# Patient Record
Sex: MALE | Race: NATIVE HAWAIIAN OR OTHER PACIFIC ISLANDER
[De-identification: names, ages, dates, MRNs, and addresses within clinical notes are randomized per-mention and may not be internally consistent; named-entity substitution may affect disease eponyms.]

---

## 2019-03-30 ENCOUNTER — HOSPITAL ENCOUNTER (EMERGENCY)
Dept: HOSPITAL 25 - UCEAST | Age: 24
Discharge: HOME | End: 2019-03-30
Payer: COMMERCIAL

## 2019-03-30 VITALS — SYSTOLIC BLOOD PRESSURE: 120 MMHG | DIASTOLIC BLOOD PRESSURE: 80 MMHG

## 2019-03-30 DIAGNOSIS — R19.7: ICD-10-CM

## 2019-03-30 DIAGNOSIS — R11.2: Primary | ICD-10-CM

## 2019-03-30 PROCEDURE — 96375 TX/PRO/DX INJ NEW DRUG ADDON: CPT

## 2019-03-30 PROCEDURE — 96374 THER/PROPH/DIAG INJ IV PUSH: CPT

## 2019-03-30 PROCEDURE — 96360 HYDRATION IV INFUSION INIT: CPT

## 2019-03-30 PROCEDURE — 99202 OFFICE O/P NEW SF 15 MIN: CPT

## 2019-03-30 PROCEDURE — G0463 HOSPITAL OUTPT CLINIC VISIT: HCPCS

## 2019-03-30 NOTE — UC
Abdominal Pain Female HPI





- HPI Summary


HPI Summary: 





Patient presents to urgent care with his significant other.  Patient's a 

[employer] student.  Patient states yesterday he had available at 1:00.  Patient 

states at 9 PM he had frozen mangoes.  Patient states during at 10:00 he had 

multiple episodes of emesis.  Patient states approximately 8-10.  No blood no 

bilious.  Patient also with 4 episodes of diarrhea.  Patient with some mild 

epigastric discomfort that started with the vomiting.  No fevers or chills.  No 

rash.  No head congestion, sore throats.  No myalgias.  Patient denies sick 

contacts.  Patient denies questionable food.  Patient states she just feels 

dehydrated and tired.  Patient was diagnosed and treated with mono 

approximately 2 months ago.  Patient takes no medications.  Patient has not 

taken any illicit substances.  Patient states his roommate is not sick.





- History of Current Complaint


Chief Complaint: [facility]


Stated Complaint: FLU LIKE SYMP


Time Seen by Provider: 03/30/19 07:46


Hx Obtained From: Patient


Pregnant?: No


Onset/Duration: Sudden Onset


Severity Initially: Mild


Severity Currently: Mild


Pain Intensity: 4


Allergies/Adverse Reactions: 


 Allergies











Allergy/AdvReac Type Severity Reaction Status Date / Time


 


No Known Allergies Allergy   Verified 03/30/19 07:42














PMH/Surg Hx/FS Hx/Imm Hx


Previously Healthy: Yes





- Surgical History


Surgical History: None





- Family History


Known Family History: Positive: Non-Contributory





- Social History


Occupation: Student


Lives: Dormitory/Roommates


Alcohol Use: Weekly


Substance Use Type: None


Smoking Status (MU): Never Smoked Tobacco





Review of Systems


All Other Systems Reviewed And Are Negative: Yes


Constitutional: Positive: Fatigue.  Negative: Fever


Respiratory: Positive: Negative


Cardiovascular: Positive: Negative


Gastrointestinal: Positive: Abdominal Pain - epigastric, Vomiting, Diarrhea, 

Nausea


Is Patient Immunocompromised?: No





Physical Exam





- Summary


Physical Exam Summary: 





Vital Signs Reviewed: Yes


A+Ox3, tired appearing


Eyes: Conjunctiva Clear, MANJULA. EOM intact and full


ENT: Hearing grossly normal  TM x 2 clear, mm pasty, lips dry, uvula midline, 

no exudate, no erythema


Neck: Positive: Supple


Respiratory: Positive: No respiratory distress, No accessory muscle use + CTA 

throughout  no w/r


Cardiovascular: RRR  nl s1, s2  no m/r  CBT <2  sec


abd soft + BS nt  no guarding, no distension, mild epigastric discomfort


Musculoskeletal Exam: MOORE x 4 without difficulty Strength Intact, ROM Intact


Neurological: Positive: Alert,  + sensation throughout


Psychological: Positive: Normal Response To Family


Skin: Positive: no rash, no ecchymosis


Triage Information Reviewed: Yes


Vital Signs: 


 Initial Vital Signs











Temp  98.1 F   03/30/19 07:38


 


Pulse  110   03/30/19 07:38


 


Resp  18   03/30/19 07:38


 


BP  120/80   03/30/19 07:38


 


Pulse Ox  99   03/30/19 07:38














Re-Evaluation





- Re-Evaluation


  ** First Eval


Re-Evaluation Time: 08:54


Change: Improved


Comment: Pt eating popsicle and drinking water without difficulty.  Nausea 

resolved.  continues with mild epigastric pain - soft - no guarding, no 

rebound.  reviewed with pt and so - will Rx zofran.  clears to bland.  monitor 

for increased or focal pain.  strict return precautions.  pt states 

understanding and agreement with plan





Abd Pain Female Course/Dx





- Course


Course Of Treatment: 





Patient presents to urgent care with 12 hours of nonbloody nonblack emesis as 

well as 4 episodes of diarrhea.  Patient with mild epigastric pain.  Patient 

without any other complaints.  No sick contacts.  No fevers chills.  No urinary 

symptoms.  No illicit substances.  On exam vital signs reveal mild tachycardia.

  Patient afebrile with stable blood pressure.  Patient with mild dehydration 

on exam.  Discussed with patient options.  We'll place IV give Zofran as well 

as Pepcid.  We'll do a by mouth trial.  Patient requests I speak to his mother, 

renay, who is a pediatrician.  I did so and she also is agreement with plan.  

We'll reassess.





- Differential Dx/Diagnosis


Provider Diagnosis: 


 Nausea & vomiting, Diarrhea








Discharge





- Sign-Out/Discharge


Documenting (check all that apply): Patient Departure


All imaging exams completed and their final reports reviewed: No Studies





- Discharge Plan


Condition: Stable


Disposition: HOME


Prescriptions: 


Ondansetron ODT TAB* [Zofran 4 MG Odt TAB*] 4 mg PO Q4H PRN #10 tab.odt


 PRN Reason: Nausea


Patient Education Materials:  Acute Nausea and Vomiting (ED), Acute Abdominal 

Pain (ED)


Referrals: 


[facility] [Provider Group]


Additional Instructions: 





As discussed - the doctor thinks it is okay for your to go home - but you have 

to carefully monitor your symptoms. If you have increase abdominal pain, pain 

that focuses on one area (as discussed your right lower abdomen area) - it is 

recommended you go directly to the emergency department for further evaluation 

and treatment.





- For the first 6 hours, eat and drink clears (water, ginger ale, soup broth, 

jello, popsicles, Gatorade). If you tolerate this okay, add bland foods such as 

dry toast, scrambled eggs, crackers.  Wait until you are feeling better for 24 

hours before eating spicy food, acidic food, tomato based food, fried food.





-These infections are spread by oral secretions. Do not share eating or 

drinking utensils. Frequent hand washing is important. Clean items that may get 

your secretions on them such as cell phones, ipads, computer mouse, television 

remotes.  Once you feel better, change your pillowcase and your toothbrush





- Okay to take medication as prescribed for nausea





- Okay to take Pepcid 20mg daily to help decrease stomach acid





- If you develop increased or uncontrolled pain, uncontrolled vomiting, stop 

making urine, perisistent fevers or any other concerns it is recommended you go 

to the emergency department for further evaluation





- Billing Disposition and Condition


Condition: STABLE


Disposition: Home